# Patient Record
Sex: MALE | Race: WHITE | HISPANIC OR LATINO | Employment: FULL TIME | URBAN - METROPOLITAN AREA
[De-identification: names, ages, dates, MRNs, and addresses within clinical notes are randomized per-mention and may not be internally consistent; named-entity substitution may affect disease eponyms.]

---

## 2017-11-09 ENCOUNTER — OFFICE VISIT (OUTPATIENT)
Dept: INTERNAL MEDICINE | Facility: CLINIC | Age: 47
End: 2017-11-09
Payer: COMMERCIAL

## 2017-11-09 ENCOUNTER — LAB VISIT (OUTPATIENT)
Dept: LAB | Facility: HOSPITAL | Age: 47
End: 2017-11-09
Attending: FAMILY MEDICINE
Payer: COMMERCIAL

## 2017-11-09 VITALS
BODY MASS INDEX: 31.12 KG/M2 | HEIGHT: 73 IN | HEART RATE: 68 BPM | DIASTOLIC BLOOD PRESSURE: 84 MMHG | RESPIRATION RATE: 16 BRPM | WEIGHT: 234.81 LBS | TEMPERATURE: 98 F | OXYGEN SATURATION: 99 % | SYSTOLIC BLOOD PRESSURE: 131 MMHG

## 2017-11-09 DIAGNOSIS — Z00.00 ROUTINE HEALTH MAINTENANCE: Primary | ICD-10-CM

## 2017-11-09 DIAGNOSIS — E66.9 OBESITY (BMI 30.0-34.9): ICD-10-CM

## 2017-11-09 DIAGNOSIS — Z00.00 ROUTINE HEALTH MAINTENANCE: ICD-10-CM

## 2017-11-09 LAB
ALBUMIN SERPL BCP-MCNC: 4.3 G/DL
ALP SERPL-CCNC: 68 U/L
ALT SERPL W/O P-5'-P-CCNC: 42 U/L
ANION GAP SERPL CALC-SCNC: 9 MMOL/L
AST SERPL-CCNC: 22 U/L
BASOPHILS # BLD AUTO: 0.02 K/UL
BASOPHILS NFR BLD: 0.3 %
BILIRUB SERPL-MCNC: 0.6 MG/DL
BUN SERPL-MCNC: 14 MG/DL
CALCIUM SERPL-MCNC: 9.4 MG/DL
CHLORIDE SERPL-SCNC: 105 MMOL/L
CHOLEST SERPL-MCNC: 203 MG/DL
CHOLEST/HDLC SERPL: 5.8 {RATIO}
CO2 SERPL-SCNC: 26 MMOL/L
CREAT SERPL-MCNC: 1 MG/DL
DIFFERENTIAL METHOD: NORMAL
EOSINOPHIL # BLD AUTO: 0 K/UL
EOSINOPHIL NFR BLD: 0 %
ERYTHROCYTE [DISTWIDTH] IN BLOOD BY AUTOMATED COUNT: 12.5 %
EST. GFR  (AFRICAN AMERICAN): >60 ML/MIN/1.73 M^2
EST. GFR  (NON AFRICAN AMERICAN): >60 ML/MIN/1.73 M^2
ESTIMATED AVG GLUCOSE: 103 MG/DL
GLUCOSE SERPL-MCNC: 97 MG/DL
HBA1C MFR BLD HPLC: 5.2 %
HCT VFR BLD AUTO: 42.9 %
HDLC SERPL-MCNC: 35 MG/DL
HDLC SERPL: 17.2 %
HGB BLD-MCNC: 14.7 G/DL
LDLC SERPL CALC-MCNC: 101.2 MG/DL
LYMPHOCYTES # BLD AUTO: 2.6 K/UL
LYMPHOCYTES NFR BLD: 37.4 %
MCH RBC QN AUTO: 29.5 PG
MCHC RBC AUTO-ENTMCNC: 34.3 G/DL
MCV RBC AUTO: 86 FL
MONOCYTES # BLD AUTO: 0.5 K/UL
MONOCYTES NFR BLD: 6.8 %
NEUTROPHILS # BLD AUTO: 3.9 K/UL
NEUTROPHILS NFR BLD: 55.4 %
NONHDLC SERPL-MCNC: 168 MG/DL
PLATELET # BLD AUTO: 232 K/UL
PMV BLD AUTO: 11 FL
POTASSIUM SERPL-SCNC: 4.2 MMOL/L
PROT SERPL-MCNC: 7.8 G/DL
RBC # BLD AUTO: 4.98 M/UL
SODIUM SERPL-SCNC: 140 MMOL/L
TRIGL SERPL-MCNC: 334 MG/DL
WBC # BLD AUTO: 7.01 K/UL

## 2017-11-09 PROCEDURE — 80061 LIPID PANEL: CPT

## 2017-11-09 PROCEDURE — 36415 COLL VENOUS BLD VENIPUNCTURE: CPT | Mod: PO

## 2017-11-09 PROCEDURE — 80053 COMPREHEN METABOLIC PANEL: CPT | Mod: PO

## 2017-11-09 PROCEDURE — 99386 PREV VISIT NEW AGE 40-64: CPT | Mod: S$GLB,,, | Performed by: FAMILY MEDICINE

## 2017-11-09 PROCEDURE — 83036 HEMOGLOBIN GLYCOSYLATED A1C: CPT

## 2017-11-09 PROCEDURE — 85025 COMPLETE CBC W/AUTO DIFF WBC: CPT | Mod: PO

## 2017-11-09 PROCEDURE — 99999 PR PBB SHADOW E&M-NEW PATIENT-LVL III: CPT | Mod: PBBFAC,,, | Performed by: FAMILY MEDICINE

## 2017-11-09 PROCEDURE — 86703 HIV-1/HIV-2 1 RESULT ANTBDY: CPT

## 2017-11-09 RX ORDER — MULTIVITAMIN
1 TABLET ORAL DAILY
COMMUNITY

## 2017-11-09 NOTE — PROGRESS NOTES
"Subjective:       Patient ID: Nura Patton is a 47 y.o. male.    Chief Complaint: Annual Exam    HPI  Here today for routine exam.  He has no complaints at this time and is simply here for annual checkup.  He has been in this area working for [employer].  He is here with his family and is originally from [place].  He is of [place] descendents.  Says that he has a fairly stressful job at times.  Not exercising as he used to.  No other complaints or stressors that he can identify.    Patient Active Problem List   Diagnosis    Obesity (BMI 30.0-34.9)       Family History   Problem Relation Age of Onset    No Known Problems Mother     No Known Problems Father      Past Surgical History:   Procedure Laterality Date    MOLE REMOVAL Bilateral     neck         Current Outpatient Prescriptions:     multivitamin (ONE DAILY MULTIVITAMIN) per tablet, Take 1 tablet by mouth once daily., Disp: , Rfl:     Review of Systems   Constitutional: Negative for chills, fever and unexpected weight change.   HENT: Negative for congestion, ear pain, sore throat and voice change.    Eyes: Negative for pain and visual disturbance.   Respiratory: Negative for cough, shortness of breath and wheezing.    Cardiovascular: Negative for chest pain.   Gastrointestinal: Negative for abdominal pain, nausea and vomiting.   Genitourinary: Negative for difficulty urinating.   Musculoskeletal: Negative for back pain.   Skin: Negative for rash.   Neurological: Negative for dizziness and speech difficulty.   Hematological: Does not bruise/bleed easily.   Psychiatric/Behavioral: Negative for sleep disturbance and suicidal ideas. The patient is not nervous/anxious.        Objective:   /84 (BP Location: Left arm, Patient Position: Sitting, BP Method: X-Large (Automatic))   Pulse 68   Temp 98.3 °F (36.8 °C) (Tympanic)   Resp 16   Ht 6' 0.5" (1.842 m)   Wt 106.5 kg (234 lb 12.6 oz)   SpO2 99%   BMI 31.41 kg/m²      Physical Exam   Constitutional: " He is oriented to person, place, and time. He appears well-developed and well-nourished. No distress.   HENT:   Head: Normocephalic and atraumatic.   Nose: Nose normal.   Eyes: Conjunctivae and EOM are normal. Pupils are equal, round, and reactive to light. Right eye exhibits no discharge. Left eye exhibits no discharge.   Neck: No thyromegaly present.   Cardiovascular: Normal rate, regular rhythm and normal heart sounds.    No murmur heard.  Pulmonary/Chest: Effort normal and breath sounds normal. No respiratory distress. He has no wheezes.   Abdominal: Soft. He exhibits no distension.   Musculoskeletal: He exhibits no edema.   Neurological: He is alert and oriented to person, place, and time.   Skin: Skin is warm. No rash noted. He is not diaphoretic.   Psychiatric: He has a normal mood and affect. His behavior is normal.   Vitals reviewed.      Assessment & Plan     1. Routine health maintenance  No concerns from physical exam.  We'll get labs today for routine screening.  Encouraged him to try exercising more for cardiovascular benefits and to lose weight as well as stress relief.  Recommended following up yearly.  - Lipid panel; Future  - Hemoglobin A1c; Future  - Comprehensive metabolic panel; Future  - HIV-1 and HIV-2 antibodies; Future  - CBC auto differential; Future    2. Obesity (BMI 30.0-34.9)  Counseled on importance on diet and exercise to decrease risk of comorbid conditions and for improved quality of life.    - Hemoglobin A1c; Future

## 2017-11-10 LAB — HIV 1+2 AB+HIV1 P24 AG SERPL QL IA: NEGATIVE

## 2018-02-01 ENCOUNTER — OFFICE VISIT (OUTPATIENT)
Dept: INTERNAL MEDICINE | Facility: CLINIC | Age: 48
End: 2018-02-01
Payer: COMMERCIAL

## 2018-02-01 VITALS
HEIGHT: 73 IN | TEMPERATURE: 98 F | OXYGEN SATURATION: 96 % | RESPIRATION RATE: 18 BRPM | HEART RATE: 91 BPM | WEIGHT: 232.81 LBS | BODY MASS INDEX: 30.85 KG/M2 | SYSTOLIC BLOOD PRESSURE: 126 MMHG | DIASTOLIC BLOOD PRESSURE: 84 MMHG

## 2018-02-01 DIAGNOSIS — G93.31 POST VIRAL SYNDROME: Primary | ICD-10-CM

## 2018-02-01 PROCEDURE — 3008F BODY MASS INDEX DOCD: CPT | Mod: S$GLB,,, | Performed by: FAMILY MEDICINE

## 2018-02-01 PROCEDURE — 99213 OFFICE O/P EST LOW 20 MIN: CPT | Mod: S$GLB,,, | Performed by: FAMILY MEDICINE

## 2018-02-01 PROCEDURE — 99999 PR PBB SHADOW E&M-EST. PATIENT-LVL III: CPT | Mod: PBBFAC,,, | Performed by: FAMILY MEDICINE

## 2018-02-01 RX ORDER — MECLIZINE HYDROCHLORIDE 25 MG/1
25 TABLET ORAL 3 TIMES DAILY PRN
Qty: 30 TABLET | Refills: 0 | Status: SHIPPED | OUTPATIENT
Start: 2018-02-01 | End: 2018-02-12

## 2018-02-01 NOTE — ASSESSMENT & PLAN NOTE
No concerning findings on physical exam and considering his history with recent viral illness, I think that his current symptoms are secondary to a post viral syndrome.  Recommended rest, adequate hydration and a trial of meclizine to see if this helps with her symptoms.  Advised him to follow-up for these communicate with us on Monday if his symptoms persist.  At that time would consider possible lab studies and further workup.

## 2018-02-01 NOTE — MEDICAL/APP STUDENT
Subjective:       Patient ID: Nura Patton is a 47 y.o. male.    Chief Complaint: Dizziness and Follow-up    The patient is presenting for follow up of routing health maintenance exam as well as new onset dizziness.  The patient's dizziness began last Sunday after the patient had drank some beer.  At the time, the patient believed that the dizziness was attributable to the beer but after it persisted for several days began to become concerned.  The patient states that the dizziness is constant with minor fluctuations.  The patient sates that they also had an illness that they believed to be the flu which included fever and myalgias and began on Friday and had started to wane the day that their dizziness began.  The patient believes that a relative had a similar issue with dizziness and that it involved a blockage in their ear.      Review of Systems   Constitutional: Negative for chills, fatigue and fever.   HENT: Negative for congestion, ear pain, rhinorrhea, sinus pain, sinus pressure, sneezing and sore throat.    Respiratory: Negative for cough.    Gastrointestinal: Negative for diarrhea, nausea and vomiting.   Musculoskeletal: Negative for gait problem.   Neurological: Positive for dizziness. Negative for headaches.       Objective:      Physical Exam   Constitutional: He appears well-developed and well-nourished. No distress.   HENT:   Right Ear: Tympanic membrane and ear canal normal.   Left Ear: Tympanic membrane and ear canal normal.   Nose: Nose normal. Right sinus exhibits no maxillary sinus tenderness and no frontal sinus tenderness. Left sinus exhibits no maxillary sinus tenderness and no frontal sinus tenderness.   Mouth/Throat: Oropharynx is clear and moist.   Eyes: EOM are normal. Pupils are equal, round, and reactive to light.   Cardiovascular: Normal rate, regular rhythm and normal heart sounds.    Pulmonary/Chest: Effort normal and breath sounds normal.   Neurological: No cranial nerve deficit.    Negative Kvng-Hallpike       Assessment:       Dizziness  Given recent viral illness, the patient is likely experiencing some residual symptoms.  Unlikely to be BPPV given relatively mild symptoms and negative [place]-Hallpike maneuver.      Plan:       Dizziness  The patient can take meclizine to help with symptoms if they become impairing.  Patient instructed to return if symptoms persist into next week.

## 2018-02-01 NOTE — PROGRESS NOTES
"Subjective:       Patient ID: Nura Patton is a 47 y.o. male.    Chief Complaint: Dizziness and Follow-up    HPI  Dizziness started 4 days ago while drinking beer. Reports as constant. Not worse with movement or any activity. Vomited on first day.  Has had no further episodes of vomiting. Reports having the flu over the weekend also.  Started having flulike symptoms on Friday and on Saturday he was in bed all day.  Not having any fever, chills or other coughing symptoms.  Recently had lab work and everything seemed within normal limits except for mildly elevated cholesterol.  Has not taken any medication for the dizziness.  Does not feel that it is getting worse but just sort of lingering.    Family History   Problem Relation Age of Onset    No Known Problems Mother     No Known Problems Father        Current Outpatient Prescriptions:     multivitamin (ONE DAILY MULTIVITAMIN) per tablet, Take 1 tablet by mouth once daily., Disp: , Rfl:     meclizine (ANTIVERT) 25 mg tablet, Take 1 tablet (25 mg total) by mouth 3 (three) times daily as needed for Dizziness., Disp: 30 tablet, Rfl: 0    Review of Systems   Constitutional: Negative for chills and fever.   Eyes: Negative for visual disturbance.   Respiratory: Negative for cough and shortness of breath.    Cardiovascular: Negative for chest pain.   Gastrointestinal: Negative for abdominal pain.   Neurological: Positive for dizziness.       Objective:   /84 (BP Location: Left arm, Patient Position: Sitting, BP Method: Large (Manual))   Pulse 91   Temp 97.5 °F (36.4 °C) (Tympanic)   Resp 18   Ht 6' 0.5" (1.842 m)   Wt 105.6 kg (232 lb 12.9 oz)   SpO2 96%   BMI 31.14 kg/m²      Physical Exam   Constitutional: He is oriented to person, place, and time. He appears well-developed and well-nourished.   HENT:   Head: Normocephalic and atraumatic.   No signs of inner ear infection or cerumen impaction.   Eyes: Conjunctivae are normal.   Cardiovascular: Normal " rate.    Pulmonary/Chest: Effort normal. No respiratory distress.   Musculoskeletal: He exhibits no edema.   Neurological: He is alert and oriented to person, place, and time. No cranial nerve deficit. Coordination normal.   Skin: Skin is warm and dry. No rash noted.   Psychiatric: He has a normal mood and affect. His behavior is normal.   Vitals reviewed.      Assessment & Plan     Problem List Items Addressed This Visit        Neuro    Post viral syndrome - Primary    Current Assessment & Plan     No concerning findings on physical exam and considering his history with recent viral illness, I think that his current symptoms are secondary to a post viral syndrome.  Recommended rest, adequate hydration and a trial of meclizine to see if this helps with her symptoms.  Advised him to follow-up for these communicate with us on Monday if his symptoms persist.  At that time would consider possible lab studies and further workup.                 Follow-up if symptoms worsen or fail to improve.

## 2018-02-12 ENCOUNTER — OFFICE VISIT (OUTPATIENT)
Dept: INTERNAL MEDICINE | Facility: CLINIC | Age: 48
End: 2018-02-12
Payer: COMMERCIAL

## 2018-02-12 ENCOUNTER — PATIENT OUTREACH (OUTPATIENT)
Dept: ADMINISTRATIVE | Facility: HOSPITAL | Age: 48
End: 2018-02-12

## 2018-02-12 ENCOUNTER — LAB VISIT (OUTPATIENT)
Dept: LAB | Facility: HOSPITAL | Age: 48
End: 2018-02-12
Attending: FAMILY MEDICINE
Payer: COMMERCIAL

## 2018-02-12 VITALS
RESPIRATION RATE: 18 BRPM | SYSTOLIC BLOOD PRESSURE: 132 MMHG | DIASTOLIC BLOOD PRESSURE: 87 MMHG | BODY MASS INDEX: 31.18 KG/M2 | TEMPERATURE: 97 F | WEIGHT: 235.25 LBS | HEIGHT: 73 IN | HEART RATE: 62 BPM

## 2018-02-12 DIAGNOSIS — R42 DIZZINESS: Primary | ICD-10-CM

## 2018-02-12 DIAGNOSIS — R42 DIZZINESS: ICD-10-CM

## 2018-02-12 LAB
ALBUMIN SERPL BCP-MCNC: 4.3 G/DL
ALP SERPL-CCNC: 80 U/L
ALT SERPL W/O P-5'-P-CCNC: 57 U/L
ANION GAP SERPL CALC-SCNC: 8 MMOL/L
AST SERPL-CCNC: 25 U/L
BASOPHILS # BLD AUTO: 0.01 K/UL
BASOPHILS NFR BLD: 0.2 %
BILIRUB SERPL-MCNC: 0.5 MG/DL
BUN SERPL-MCNC: 11 MG/DL
CALCIUM SERPL-MCNC: 9.4 MG/DL
CHLORIDE SERPL-SCNC: 103 MMOL/L
CO2 SERPL-SCNC: 28 MMOL/L
CREAT SERPL-MCNC: 1 MG/DL
DIFFERENTIAL METHOD: NORMAL
EOSINOPHIL # BLD AUTO: 0 K/UL
EOSINOPHIL NFR BLD: 0.2 %
ERYTHROCYTE [DISTWIDTH] IN BLOOD BY AUTOMATED COUNT: 12.3 %
EST. GFR  (AFRICAN AMERICAN): >60 ML/MIN/1.73 M^2
EST. GFR  (NON AFRICAN AMERICAN): >60 ML/MIN/1.73 M^2
GLUCOSE SERPL-MCNC: 100 MG/DL
HCT VFR BLD AUTO: 42.1 %
HGB BLD-MCNC: 14.9 G/DL
LYMPHOCYTES # BLD AUTO: 2.1 K/UL
LYMPHOCYTES NFR BLD: 32.4 %
MCH RBC QN AUTO: 30.1 PG
MCHC RBC AUTO-ENTMCNC: 35.4 G/DL
MCV RBC AUTO: 85 FL
MONOCYTES # BLD AUTO: 0.5 K/UL
MONOCYTES NFR BLD: 7.1 %
NEUTROPHILS # BLD AUTO: 4 K/UL
NEUTROPHILS NFR BLD: 59.9 %
PLATELET # BLD AUTO: 228 K/UL
PMV BLD AUTO: 11.4 FL
POTASSIUM SERPL-SCNC: 4.3 MMOL/L
PROT SERPL-MCNC: 7.7 G/DL
RBC # BLD AUTO: 4.95 M/UL
SODIUM SERPL-SCNC: 139 MMOL/L
WBC # BLD AUTO: 6.58 K/UL

## 2018-02-12 PROCEDURE — 80053 COMPREHEN METABOLIC PANEL: CPT | Mod: PO

## 2018-02-12 PROCEDURE — 3008F BODY MASS INDEX DOCD: CPT | Mod: S$GLB,,, | Performed by: FAMILY MEDICINE

## 2018-02-12 PROCEDURE — 85025 COMPLETE CBC W/AUTO DIFF WBC: CPT | Mod: PO

## 2018-02-12 PROCEDURE — 36415 COLL VENOUS BLD VENIPUNCTURE: CPT | Mod: PO

## 2018-02-12 PROCEDURE — 99999 PR PBB SHADOW E&M-EST. PATIENT-LVL III: CPT | Mod: PBBFAC,,, | Performed by: FAMILY MEDICINE

## 2018-02-12 PROCEDURE — 99213 OFFICE O/P EST LOW 20 MIN: CPT | Mod: S$GLB,,, | Performed by: FAMILY MEDICINE

## 2018-02-12 RX ORDER — METHYLPREDNISOLONE 4 MG/1
TABLET ORAL
Qty: 1 PACKAGE | Refills: 0 | Status: SHIPPED | OUTPATIENT
Start: 2018-02-12 | End: 2018-03-01

## 2018-02-12 NOTE — PROGRESS NOTES
"Subjective:       Patient ID: Nura Patton is a 47 y.o. male.    Chief Complaint: Dizziness    HPI  Continues to have dizziness that has been persistent for the last couple of weeks. Initially thought to be from a post-viral syndrome and was prescribed meclizine which would help briefly but it caused him a lot of drowsiness and made it difficult to tolerate. Dizziness is pretty constant with intermittent flare ups during the day. No associated symptoms.  No new symptoms from last visit. No ear pressure. No change in hearing.     Family History   Problem Relation Age of Onset    No Known Problems Mother     No Known Problems Father        Current Outpatient Prescriptions:     multivitamin (ONE DAILY MULTIVITAMIN) per tablet, Take 1 tablet by mouth once daily., Disp: , Rfl:     Review of Systems   Constitutional: Negative for chills and fever.   Eyes: Negative for visual disturbance.   Respiratory: Negative for cough and shortness of breath.    Cardiovascular: Negative for chest pain.   Gastrointestinal: Negative for abdominal pain.   Neurological: Positive for dizziness.       Objective:   /87   Pulse 62   Temp 96.7 °F (35.9 °C) (Tympanic)   Resp 18   Ht 6' 0.5" (1.842 m)   Wt 106.7 kg (235 lb 3.7 oz)   BMI 31.46 kg/m²      Physical Exam   Constitutional: He is oriented to person, place, and time. He appears well-developed and well-nourished. No distress.   HENT:   Head: Normocephalic and atraumatic.   Nose: Nose normal.   Eyes: Conjunctivae and EOM are normal. Pupils are equal, round, and reactive to light. Right eye exhibits no discharge. Left eye exhibits no discharge.   Neck: No thyromegaly present.   Cardiovascular: Normal rate, regular rhythm and normal heart sounds.    No murmur heard.  Pulmonary/Chest: Effort normal and breath sounds normal. No respiratory distress. He has no wheezes.   Abdominal: Soft. He exhibits no distension.   Musculoskeletal: He exhibits no edema.   Neurological: He is " alert and oriented to person, place, and time.   Symptomatically worse with chuck halpike to left but no nystagmus. When performing epley maneuver he felt most dizziness when rotated all the way to the right. Upon sitting felt dizziness again   Skin: Skin is warm. No rash noted. He is not diaphoretic.   Psychiatric: He has a normal mood and affect. His behavior is normal.   Vitals reviewed.      Assessment & Plan     Problem List Items Addressed This Visit        Other    Dizziness - Primary    Current Assessment & Plan     Unclear at this time what is causing the dizziness that is now persistent for over two weeks. Getting labs to rule out anemia, glucose or other metabolic syndrome. If all normal will try medrol dosepak and if not better by 3 days from now will pursue head imaging.          Relevant Orders    CBC auto differential    Comprehensive metabolic panel            No Follow-up on file.

## 2018-02-12 NOTE — ASSESSMENT & PLAN NOTE
Unclear at this time what is causing the dizziness that is now persistent for over two weeks. Getting labs to rule out anemia, glucose or other metabolic syndrome. If all normal will try medrol dosepak and if not better by 3 days from now will pursue head imaging.

## 2018-02-13 ENCOUNTER — PATIENT MESSAGE (OUTPATIENT)
Dept: INTERNAL MEDICINE | Facility: CLINIC | Age: 48
End: 2018-02-13

## 2018-02-17 ENCOUNTER — PATIENT MESSAGE (OUTPATIENT)
Dept: INTERNAL MEDICINE | Facility: CLINIC | Age: 48
End: 2018-02-17

## 2018-03-01 ENCOUNTER — OFFICE VISIT (OUTPATIENT)
Dept: INTERNAL MEDICINE | Facility: CLINIC | Age: 48
End: 2018-03-01
Payer: COMMERCIAL

## 2018-03-01 VITALS
SYSTOLIC BLOOD PRESSURE: 128 MMHG | RESPIRATION RATE: 16 BRPM | BODY MASS INDEX: 31.77 KG/M2 | HEIGHT: 72 IN | OXYGEN SATURATION: 96 % | HEART RATE: 77 BPM | TEMPERATURE: 97 F | WEIGHT: 234.56 LBS | DIASTOLIC BLOOD PRESSURE: 90 MMHG

## 2018-03-01 DIAGNOSIS — H93.11 TINNITUS OF RIGHT EAR: ICD-10-CM

## 2018-03-01 DIAGNOSIS — R42 DIZZINESS: Primary | ICD-10-CM

## 2018-03-01 PROBLEM — G93.31 POST VIRAL SYNDROME: Status: RESOLVED | Noted: 2018-02-01 | Resolved: 2018-03-01

## 2018-03-01 PROCEDURE — 99999 PR PBB SHADOW E&M-EST. PATIENT-LVL III: CPT | Mod: PBBFAC,,, | Performed by: FAMILY MEDICINE

## 2018-03-01 PROCEDURE — 99213 OFFICE O/P EST LOW 20 MIN: CPT | Mod: S$GLB,,, | Performed by: FAMILY MEDICINE

## 2018-03-06 ENCOUNTER — CLINICAL SUPPORT (OUTPATIENT)
Dept: AUDIOLOGY | Facility: CLINIC | Age: 48
End: 2018-03-06
Payer: COMMERCIAL

## 2018-03-06 ENCOUNTER — OFFICE VISIT (OUTPATIENT)
Dept: OTOLARYNGOLOGY | Facility: CLINIC | Age: 48
End: 2018-03-06
Payer: COMMERCIAL

## 2018-03-06 VITALS
DIASTOLIC BLOOD PRESSURE: 88 MMHG | TEMPERATURE: 98 F | HEART RATE: 77 BPM | HEIGHT: 72 IN | SYSTOLIC BLOOD PRESSURE: 125 MMHG

## 2018-03-06 DIAGNOSIS — R42 VERTIGO: Primary | ICD-10-CM

## 2018-03-06 DIAGNOSIS — H93.13 TINNITUS OF BOTH EARS: ICD-10-CM

## 2018-03-06 DIAGNOSIS — R42 DIZZINESS: Primary | ICD-10-CM

## 2018-03-06 DIAGNOSIS — H69.93 DYSFUNCTION OF BOTH EUSTACHIAN TUBES: ICD-10-CM

## 2018-03-06 PROCEDURE — 92557 COMPREHENSIVE HEARING TEST: CPT | Mod: S$GLB,,, | Performed by: AUDIOLOGIST

## 2018-03-06 PROCEDURE — 99203 OFFICE O/P NEW LOW 30 MIN: CPT | Mod: S$GLB,,, | Performed by: PHYSICIAN ASSISTANT

## 2018-03-06 PROCEDURE — 99999 PR PBB SHADOW E&M-EST. PATIENT-LVL III: CPT | Mod: PBBFAC,,, | Performed by: PHYSICIAN ASSISTANT

## 2018-03-06 PROCEDURE — 92567 TYMPANOMETRY: CPT | Mod: S$GLB,,, | Performed by: AUDIOLOGIST

## 2018-03-06 RX ORDER — FLUTICASONE PROPIONATE 50 MCG
1 SPRAY, SUSPENSION (ML) NASAL DAILY
COMMUNITY

## 2018-03-06 NOTE — PROGRESS NOTES
Subjective:       Patient ID: Nura Patton is a 47 y.o. male.    Chief Complaint: Dizziness and Tinnitus (Right ear )    Patient is a very pleasant 47 y.o. male here to see me today for the first time for evaluation of dizziness.   He reports that the symptoms have been present for the last 3 months.  On average, the patent reports symptoms that occur approximately 3 time each days.  He describes the dizziness as whirling and says that it lasts seconds.  He has noted that it is random.  He denies aural pressure.  He has not started any new medications, and has not had any recent dietary changes.      Review of Systems    Objective:      Physical Exam   Constitutional: He is oriented to person, place, and time. Vital signs are normal. He appears well-developed and well-nourished. No distress.   HENT:   Head: Normocephalic and atraumatic.   Right Ear: Hearing, tympanic membrane, external ear and ear canal normal.   Left Ear: Hearing, tympanic membrane, external ear and ear canal normal.   Nose: Nose normal. No mucosal edema, rhinorrhea, nasal deformity or septal deviation.   Mouth/Throat: Uvula is midline, oropharynx is clear and moist and mucous membranes are normal. No trismus in the jaw. Normal dentition. No uvula swelling. No oropharyngeal exudate or posterior oropharyngeal edema.   Eyes: Conjunctivae and EOM are normal. Pupils are equal, round, and reactive to light. Right eye exhibits no chemosis. Left eye exhibits no chemosis. Right conjunctiva is not injected. Left conjunctiva is not injected. No scleral icterus.   Neck: Trachea normal and phonation normal. No tracheal tenderness present. No tracheal deviation present. No thyroid mass and no thyromegaly present.   Cardiovascular: Intact distal pulses.    Pulmonary/Chest: Effort normal. No accessory muscle usage or stridor. No respiratory distress.   Lymphadenopathy:        Head (right side): No submental, no submandibular, no preauricular and no posterior  auricular adenopathy present.        Head (left side): No submental, no submandibular, no preauricular and no posterior auricular adenopathy present.     He has no cervical adenopathy.        Right cervical: No superficial cervical and no deep cervical adenopathy present.       Left cervical: No superficial cervical and no deep cervical adenopathy present.   Neurological: He is alert and oriented to person, place, and time. No cranial nerve deficit.   Skin: Skin is warm and dry. No rash noted. No erythema.   Psychiatric: He has a normal mood and affect. His behavior is normal. Thought content normal.         NEGATIVE JANICE HALLPIKE, AU     Results reveal normal hearing sensitivity 250-8000 Hz bilaterally.  Speech Reception Thresholds were  10 dBHL for the right ear and 10 dBHL for the left ear.   Word recognition scores were excellent bilaterally.  Tympanograms were Type A for the right ear and Type A for the left ear.     Patient was counseled on the above findings.     REC:  ENT consult                Assessment:       1. Dizziness    2. Tinnitus of both ears        Plan:       I had a long discussion with the patient regarding their symptoms.  Dizziness, vertigo and disequilibrium are common symptoms reported by adults during visits to their doctors. They are all symptoms that can result from a peripheral vestibular disorder (a dysfunction of the balance organs of the inner ear) or central vestibular disorder (a dysfunction of one or more parts of the central nervous system that help process balance and spatial information).  There are also non-vestibular causes of dizziness, and dizziness can be linked to a wide array of problems such as blood-flow irregularities from cardiovascular problems and blood pressure fluctuations.  I would recommend a VNG if symptoms persists.    We reviewe his audiogram together in detail.  We also discussed that tinnitus is most often caused by a hearing loss, and that as the hair  cells are damaged, either genetic or as a result of loud noise exposure, they then cause tinnitus.  Some patients find that restricting the salt or caffeine in their diet helps, and there is also an OTC supplement, lipflavinoids, that some people find to be effective though their benefit is not fully proven.  Tinnitus tends to be louder in times of stress and fatigue, and may decrease with time.  Sound machines may also be an effective masking technique if needed at night. I have also rec restarting flonase. RTC in 1 month

## 2018-03-06 NOTE — LETTER
March 6, 2018      [place]  [id number] [address] [address]           [facility] - ENT  [address]  [facility] [address]  Phone: [phone]  Fax: [phone]          Patient: Nura Patton   MR Number: [id number]   YOB: 1970   Date of Visit: 3/6/2018       Dear Dr. Emmanuel Mckeon:    Thank you for referring Nura Patton to me for evaluation. Attached you will find relevant portions of my assessment and plan of care.    If you have questions, please do not hesitate to call me. I look forward to following Nura Patton along with you.    Sincerely,    Amelia Figueroa PA-C    Enclosure  CC:  No Recipients    If you would like to receive this communication electronically, please contact [email or web address] or [phone] to request more information on [facility] Link access.    For providers and/or their staff who would like to refer a patient to [facility], please contact us through our one-stop-shop provider referral line, [facility], at [phone].    If you feel you have received this communication in error or would no longer like to receive these types of communications, please e-mail [email or web address]

## 2018-03-06 NOTE — PROGRESS NOTES
Nura Patton was seen 03/06/2018 for an audiological evaluation.  Patient complains of dizziness, intermittent tinnitus of multiple frequencies, popping, and stopped up sensation bilaterally for the past 3 weeks, following a sinus infection.  He denies hearing loss.    NEGATIVE JANICE HALLPIKE, AU    Results reveal normal hearing sensitivity 250-8000 Hz bilaterally.  Speech Reception Thresholds were  10 dBHL for the right ear and 10 dBHL for the left ear.   Word recognition scores were excellent bilaterally.  Tympanograms were Type A for the right ear and Type A for the left ear.    Patient was counseled on the above findings.    REC:  ENT consult

## 2018-03-07 PROBLEM — H93.11 TINNITUS OF RIGHT EAR: Status: ACTIVE | Noted: 2018-03-07

## 2018-03-08 NOTE — PROGRESS NOTES
Subjective:       Patient ID: Nura Patton is a 47 y.o. male.    Chief Complaint: Dizziness    HPI  Nura is here today to follow-up on dizziness.  This has persisted for several weeks now without any significant improvement.  For some time he had improvement whenever he was on a brief course of oral steroid but symptoms resolved afterwards.  He says over the last several days has been noticing a fullness of his right ear and some ringing sensation.  Has not had any new symptoms such as palpitations.  No syncope.  Has not noticed any pain that causes his symptoms be worse.  They can come on whenever he is just sitting still.    Family History   Problem Relation Age of Onset    No Known Problems Mother     No Known Problems Father        Current Outpatient Prescriptions:     multivitamin (ONE DAILY MULTIVITAMIN) per tablet, Take 1 tablet by mouth once daily., Disp: , Rfl:     fluticasone (FLONASE) 50 mcg/actuation nasal spray, 1 spray by Each Nare route once daily., Disp: , Rfl:     Review of Systems   Constitutional: Negative for chills and fever.   Eyes: Negative for visual disturbance.   Respiratory: Negative for cough and shortness of breath.    Cardiovascular: Negative for chest pain.   Gastrointestinal: Negative for abdominal pain.   Neurological: Positive for dizziness.       Objective:   BP (!) 128/90   Pulse 77   Temp 97.2 °F (36.2 °C) (Tympanic)   Resp 16   Ht 6' (1.829 m)   Wt 106.4 kg (234 lb 9.1 oz)   SpO2 96%   BMI 31.81 kg/m²      Physical Exam   Constitutional: He is oriented to person, place, and time. He appears well-developed and well-nourished. No distress.   HENT:   Head: Normocephalic and atraumatic.   Nose: Nose normal.   Eyes: Conjunctivae and EOM are normal. Pupils are equal, round, and reactive to light. Right eye exhibits no discharge. Left eye exhibits no discharge.   Neck: No thyromegaly present.   Cardiovascular: Normal rate, regular rhythm and normal heart sounds.    No  murmur heard.  Pulmonary/Chest: Effort normal and breath sounds normal. No respiratory distress. He has no wheezes.   Abdominal: Soft. He exhibits no distension.   Musculoskeletal: He exhibits no edema.   Neurological: He is alert and oriented to person, place, and time.   Skin: Skin is warm. No rash noted. He is not diaphoretic.   Psychiatric: He has a normal mood and affect. His behavior is normal.   Vitals reviewed.      Assessment & Plan     Problem List Items Addressed This Visit        ENT    Tinnitus of right ear    Relevant Orders    Ambulatory Referral to ENT       Other    Dizziness - Primary    Current Assessment & Plan     Since symptoms have been persistent for quite some time now without any improvement, and the fact that he has now had development of some ringing in his right ear will refer to ENT for additional recommendations.  Did not think any head imaging is necessary at this time.  Neuro exam continues to be normal.         Relevant Orders    Ambulatory Referral to ENT            No Follow-up on file.

## 2018-03-08 NOTE — ASSESSMENT & PLAN NOTE
Since symptoms have been persistent for quite some time now without any improvement, and the fact that he has now had development of some ringing in his right ear will refer to ENT for additional recommendations.  Did not think any head imaging is necessary at this time.  Neuro exam continues to be normal.